# Patient Record
Sex: FEMALE | Race: ASIAN
[De-identification: names, ages, dates, MRNs, and addresses within clinical notes are randomized per-mention and may not be internally consistent; named-entity substitution may affect disease eponyms.]

---

## 2017-06-09 ENCOUNTER — HOSPITAL ENCOUNTER (OUTPATIENT)
Dept: HOSPITAL 62 - WI | Age: 57
End: 2017-06-09
Attending: FAMILY MEDICINE
Payer: OTHER GOVERNMENT

## 2017-06-09 DIAGNOSIS — Z12.31: Primary | ICD-10-CM

## 2017-06-09 DIAGNOSIS — N95.1: ICD-10-CM

## 2017-06-09 PROCEDURE — 77067 SCR MAMMO BI INCL CAD: CPT

## 2017-06-09 PROCEDURE — G0202 SCR MAMMO BI INCL CAD: HCPCS

## 2017-06-09 PROCEDURE — 77080 DXA BONE DENSITY AXIAL: CPT

## 2017-06-09 NOTE — WOMENS IMAGING REPORT
EXAM DESCRIPTION:  BONE DENSITY HIP/SPINE



COMPLETED DATE/TIME:  6/9/2017 2:02 pm



REASON FOR STUDY:  N95.1 Z12.31  ENCNTR SCREEN MAMMOGRAM FOR MALIGNANT NEOPLASM OF ZAID N95.1  MENOPAU
SAL AND FEMALE CLIMACTERIC STATES



COMPARISON:   None.



TECHNIQUE:  Dual-Energy X-ray Absorptiometry (DEXA) of the AP Spine and Hip.



LIMITATIONS:  None.



FINDINGS:  LUMBAR SPINE:

The bone mineral density (BMD) measured from L1-L4 in the AP projection correlates with a T-score of 
-2.5, which is osteopenia as defined by the World Health Organization.

HIP:

The bone mineral density (BMD) measured in the left hip correlates with a T-score of -2.1, which is o
steopenia as defined by the World Health Organization.



IMPRESSION:  1. LUMBAR SPINE: Osteopenia

2. HIP: Osteopenia



COMMENT:  The World Health Organization defines low BMD as follows:

T-score:

Normal:  Greater than -1.0

Osteopenia: Between -1.0 and -2.5

Osteoporosis:  Less than -2.5 without fractures

Established osteoporosis:  Less than -2.5 with fractures

In general, you may wish to consider:

Diagnosis          Treatment                     Follow-up DEXA

Normal BMD      Prevention                    2-3 years

Osteopenia       Prevention/Therapy        1-2 years

Osteoporosis     Therapy                        Yearly



TECHNICAL DOCUMENTATION:  JOB ID:  [id number]

 [employer]- All Rights Reserved

## 2017-06-09 NOTE — WOMENS IMAGING REPORT
EXAM DESCRIPTION:  BILAT SCREENING MAMMO W/CAD



COMPLETED DATE/TIME:  6/9/2017 2:03 pm



REASON FOR STUDY:  Z12.31, ROUTINE SCREENING MAMMO Z12.31  ENCNTR SCREEN MAMMOGRAM FOR MALIGNANT NEOP
LASM OF ZAID N95.1  MENOPAUSAL AND FEMALE CLIMACTERIC STATES



COMPARISON:  August 2015



TECHNIQUE:  Standard craniocaudal and mediolateral oblique views of each breast recorded using digita
l acquisition.



LIMITATIONS:  None.



FINDINGS:  No masses, calcifications or architectural distortion. No areas of suspicion.

Read with the assistance of CAD.

.[facility] - R2 Cenova Version 1.3

.[facility] - R2 Cenova Version 1.3

.[facility] - R2 Cenova Version 2.4

.[facility] - R2 Cenova Version 2.4

.[facility] - R2  Version 9.2



IMPRESSION:  NORMAL MAMMOGRAM.  BIRADS 1.



BREAST DENSITY:  c. The breasts are heterogeneously dense, which may obscure small masses.



BIRAD:  1 NEGATIVE



RECOMMENDATION:  ROUTINE SCREENING



COMMENT:  The patient has been notified of the results by letter per MQSA requirements. Additional no
tification policies are in place for contacting patient with suspicious or incomplete findings.

Quality ID #225: The American College of Radiology recommends an annual screening mammogram for women
 aged 40 years or over. This facility utilizes a reminder system to ensure that all patients receive 
reminder letters, and/or direct phone calls for appointments. This includes reminders for routine scr
eening mammograms, diagnostic mammograms, or other Breast Imaging Interventions when appropriate.  Th
is patient will be placed in the appropriate reminder system.

The American College of Radiology (ACR) has developed recommendations for screening MRI of the breast
s in certain patient populations, to be used in conjunction with mammography.  Breast MRI surveillanc
e may be appropriate for women with more than 20% lifetime risk of developing breast cancer  as deter
mined by genetic testing, significant family history of the disease, or history of mantle radiation f
or Hodgkins Disease.  ACR Practice Guidelines 2008.



TECHNICAL DOCUMENTATION:  FINDING NUMBER: (1)

ASSESSMENT: (1)

JOB ID:  [id number]

 2011 [employer]- All Rights Reserved

## 2020-11-11 ENCOUNTER — HOSPITAL ENCOUNTER (EMERGENCY)
Dept: HOSPITAL 62 - ER | Age: 60
Discharge: LEFT BEFORE DISCHARGE | End: 2020-11-11
Payer: COMMERCIAL

## 2020-11-11 VITALS — DIASTOLIC BLOOD PRESSURE: 90 MMHG | SYSTOLIC BLOOD PRESSURE: 159 MMHG

## 2020-11-11 DIAGNOSIS — Z53.20: ICD-10-CM

## 2020-11-11 DIAGNOSIS — R42: Primary | ICD-10-CM

## 2020-11-11 DIAGNOSIS — Z88.0: ICD-10-CM

## 2020-11-11 DIAGNOSIS — R11.0: ICD-10-CM

## 2020-11-11 DIAGNOSIS — Z79.899: ICD-10-CM

## 2020-11-11 DIAGNOSIS — H43.392: ICD-10-CM

## 2020-11-11 DIAGNOSIS — Z88.8: ICD-10-CM

## 2020-11-11 LAB
ADD MANUAL DIFF: NO
ALBUMIN SERPL-MCNC: 4.9 G/DL (ref 3.5–5)
ALP SERPL-CCNC: 100 U/L (ref 38–126)
ANION GAP SERPL CALC-SCNC: 10 MMOL/L (ref 5–19)
APPEARANCE UR: CLEAR
APTT PPP: COLORLESS S
AST SERPL-CCNC: 35 U/L (ref 14–36)
BASOPHILS # BLD AUTO: 0 10^3/UL (ref 0–0.2)
BASOPHILS NFR BLD AUTO: 0.7 % (ref 0–2)
BILIRUB DIRECT SERPL-MCNC: 0.1 MG/DL (ref 0–0.4)
BILIRUB SERPL-MCNC: 0.7 MG/DL (ref 0.2–1.3)
BILIRUB UR QL STRIP: NEGATIVE
BUN SERPL-MCNC: 13 MG/DL (ref 7–20)
CALCIUM: 9.6 MG/DL (ref 8.4–10.2)
CHLORIDE SERPL-SCNC: 106 MMOL/L (ref 98–107)
CO2 SERPL-SCNC: 24 MMOL/L (ref 22–30)
EOSINOPHIL # BLD AUTO: 0.1 10^3/UL (ref 0–0.6)
EOSINOPHIL NFR BLD AUTO: 1.2 % (ref 0–6)
ERYTHROCYTE [DISTWIDTH] IN BLOOD BY AUTOMATED COUNT: 13.6 % (ref 11.5–14)
GLUCOSE SERPL-MCNC: 126 MG/DL (ref 75–110)
GLUCOSE UR STRIP-MCNC: NEGATIVE MG/DL
HCT VFR BLD CALC: 41.8 % (ref 36–47)
HGB BLD-MCNC: 14.3 G/DL (ref 12–15.5)
KETONES UR STRIP-MCNC: NEGATIVE MG/DL
LYMPHOCYTES # BLD AUTO: 2 10^3/UL (ref 0.5–4.7)
LYMPHOCYTES NFR BLD AUTO: 30 % (ref 13–45)
MCH RBC QN AUTO: 31.1 PG (ref 27–33.4)
MCHC RBC AUTO-ENTMCNC: 34.4 G/DL (ref 32–36)
MCV RBC AUTO: 91 FL (ref 80–97)
MONOCYTES # BLD AUTO: 0.4 10^3/UL (ref 0.1–1.4)
MONOCYTES NFR BLD AUTO: 5.6 % (ref 3–13)
NEUTROPHILS # BLD AUTO: 4.1 10^3/UL (ref 1.7–8.2)
NEUTS SEG NFR BLD AUTO: 62.5 % (ref 42–78)
NITRITE UR QL STRIP: NEGATIVE
PH UR STRIP: 7 [PH] (ref 5–9)
PLATELET # BLD: 204 10^3/UL (ref 150–450)
POTASSIUM SERPL-SCNC: 4.2 MMOL/L (ref 3.6–5)
PROT SERPL-MCNC: 7.7 G/DL (ref 6.3–8.2)
PROT UR STRIP-MCNC: NEGATIVE MG/DL
RBC # BLD AUTO: 4.61 10^6/UL (ref 3.72–5.28)
SP GR UR STRIP: 1
TOTAL CELLS COUNTED % (AUTO): 100 %
UROBILINOGEN UR-MCNC: NEGATIVE MG/DL (ref ?–2)
WBC # BLD AUTO: 6.5 10^3/UL (ref 4–10.5)

## 2020-11-11 PROCEDURE — 81001 URINALYSIS AUTO W/SCOPE: CPT

## 2020-11-11 PROCEDURE — 99281 EMR DPT VST MAYX REQ PHY/QHP: CPT

## 2020-11-11 PROCEDURE — 85025 COMPLETE CBC W/AUTO DIFF WBC: CPT

## 2020-11-11 PROCEDURE — 84484 ASSAY OF TROPONIN QUANT: CPT

## 2020-11-11 PROCEDURE — 80053 COMPREHEN METABOLIC PANEL: CPT

## 2020-11-11 PROCEDURE — 36415 COLL VENOUS BLD VENIPUNCTURE: CPT

## 2020-11-11 NOTE — ER DOCUMENT REPORT
ED Medical Screen (RME)





- General


Chief Complaint: Dizziness


Stated Complaint: ANXIETY, BLOOD PRESSURE ISSUES


Time Seen by Provider: 11/11/20 17:41


Primary Care Provider: 


IFEANYI BERNARD MD [Primary Care Provider] - Follow up as needed


Mode of Arrival: Medic


Information source: Patient


Notes: 





60-year-old female presented to ED for complaint of dizziness and nausea.  She 

states she has had feeling like there was a foreign body in her left eye for 

about a month and then she developed floaters about a month ago and then she had

flashes started to cover the eye a month ago.  She states she went to Dr. Pittman 

yesterday he said he was going order referral to an ophthalmologist.  She states

she got all panicky today because she felt like she was going to pass out.  She 

states her blood pressure was very high she has some headache and nausea.  She 

states she told him that she had been having panic attacks and anxiety and she 

was already on first-line 0.25 mg as needed and he added Zoloft yesterday.  She 

states she took that and the alprazolam today and she got so anxious that she 

called 911 to bring her to the emergency room.  She states she would like some 

blood and urine completed to make sure she is okay before she goes home.














I have greeted and performed a rapid initial assessment of this patient.  A 

comprehensive ED assessment and evaluation of the patient, analysis of test 

results and completion of medical decision making process will be conducted by 

an additional ED providers.























TRAVEL OUTSIDE OF THE [place] IN LAST 30 DAYS: No





- Related Data


Allergies/Adverse Reactions: 


                                        





esomeprazole [From Nexium] Allergy (Verified 11/11/20 17:41)


   


Penicillins Allergy (Verified 11/11/20 17:41)


   








Home Medications: cholesterol.  anxiety.  zoloft.  xanax





Past Medical History





- Social History


Chew tobacco use (# tins/day): No


Frequency of alcohol use: None


Drug Abuse: None





- Past Medical History


Cardiac Medical History: Reports: Hx Hypercholesterolemia


Past Surgical History: Reports: Hx Gynecologic Surgery - right tube removal 

(ectopic pregnancy), Hx Thyroid Surgery





Physical Exam





- Vital signs


Vitals: 





                                        











Temp Pulse Resp BP Pulse Ox


 


 98.8 F   90   18   167/85 H  98 


 


 11/11/20 17:37  11/11/20 17:37  11/11/20 17:37  11/11/20 17:37  11/11/20 17:37














Course





- Vital Signs


Vital signs: 





                                        











Temp Pulse Resp BP Pulse Ox


 


 98.8 F   90   18   167/85 H  98 


 


 11/11/20 17:42  11/11/20 17:37  11/11/20 17:37  11/11/20 17:37  11/11/20 17:37














Doctor's Discharge





- Discharge


Referrals: 


IFEANYI BERNARD MD [Primary Care Provider] - Follow up as needed

## 2020-11-13 NOTE — XMS REPORT
Patient Summary Document

                          Created on:2020



Patient:FLORA NARANJO

Sex:Female

:1960

External Reference #:[id number]





Demographics







                          Address                   [address] [place] [address]

 

                          Home Phone                [phone];PREF

 

                          Work Phone                [phone]

 

                          Mobile Phone              [phone]

 

                          Preferred Language        English

 

                          Marital Status            Not  or 

 

                          Shinto Affiliation     Unknown

 

                          Race                      Unknown

 

                          Additional Race(s)        White

 

                          Ethnic Group              Not  or 









Author







                          Organization              [facility]

 

                          Address                   [facility] [address]



                                                    [place] [address]

 

                          Phone                     [phone]









Care Team Providers







                    Name                Role                Phone

 

                    Unavailable         Unavailable         Unavailable









Allergies, Adverse Reactions, Alerts







        Allergy Name Allergy Status  Severity Reaction(s) Onset   Inactive Treat

ing 

Comments



                Type                            Date    Date    Clinician 

 

        Nexium  Allergy to Active          Other                           



                substance                                                 

 

        Penicillins Allergy to Active          Rash                            



                substance                                                 

 

        Septra  Allergy to Active          Irregular                         



                substance                 heart rate                         







Medications







       Ordered Filled Start  Stop   Current Ordering Indication Dosage Frequency

 Signature

                          Comments                  Components



      Medication Medication Date  Date  Medication? Clinician                   

(SIG)       



      Name  Name                                                        

 

      alendronate                   No                            alendronat    

   



      70 mg                                                 e 70 mg       



      tablet TK                                                 tablet TK       



      ONE T PO Q                                                 ONE T PO Q     

  



      WEEK                                                  WEEK        

 

      alprazolam                   No                            alprazolam     

  



      0.5 mg                                                 0.5 mg       



      tablet One                                                 tablet One     

  



      tablet by                                                 tablet by       



      mouth twice                                                 mouth       



      daily as                                                 twice       



      needed for                                                 daily as       



      anxiety                                                 needed for       



                                                            anxiety       

 

      atorvastati                   No                1     Q1D   atorvastat    

   



      n 10 mg                                                 in 10 mg       



      tablet Take                                                 tablet       



      1 tablet                                                 Take 1       



      every day                                                 tablet       



      by oral                                                 every day       



      route in                                                 by oral       



      the evening                                                 route in      

 



      for 90                                                 the         



      days.                                                 evening       



                                                            for 90       



                                                            days.       

 

      cetirizine                   No                            cetirizine     

  



      10 mg                                                 10 mg       



      tablet Take                                                 tablet       



      1 tablet                                                 Take 1       



      every day                                                 tablet       



      by oral                                                 every day       



      route for                                                 by oral       



      90 days.                                                 route for       



                                                            90 days.       

 

      fluticasone                   No                            fluticason    

   



      propionate                                                 e           



      50                                                    propionate       



      mcg/actuati                                                 50          



      on nasal                                                 mcg/actuat       



      spray,suspe                                                 ion nasal     

  



      nsion 1-2                                                 spray,susp      

 



      sprays each                                                 ension 1-2    

   



      nostril                                                 sprays       



      qhs.                                                  each        



                                                            nostril       



                                                            qhs.        

 

      ipratropium                   No                            ipratropiu    

   



      bromide 42                                                 m bromide      

 



      mcg (0.06                                                 42 mcg       



      %) nasal                                                 (0.06 %)       



      spray Spray                                                 nasal       



      2 sprays                                                 spray       



      twice a day                                                 Spray 2       



      by                                                    sprays       



      intranasal                                                 twice a       



      route for 7                                                 day by       



      days.                                                 intranasal       



                                                            route for       



                                                            7 days.       

 

      levocetiriz                   No                            levocetiri    

   



      ine 5 mg                                                 zine 5 mg       



      tablet Take                                                 tablet       



      1 tablet                                                 Take 1       



      every day                                                 tablet       



      by oral                                                 every day       



      route for                                                 by oral       



      90 days.                                                 route for       



                                                            90 days.       

 

      loratadine                   No                            loratadine     

  



      10 mg                                                 10 mg       



      tablet                                                 tablet       

 

      meloxicam                   No                            meloxicam       



      15 mg                                                 15 mg       



      tablet Take                                                 tablet       



      1 tablet                                                 Take 1       



      every day                                                 tablet       



      by oral                                                 every day       



      route with                                                 by oral       



      meals for                                                 route with      

 



      30 days.                                                 meals for       



                                                            30 days.       

 

      montelukast                   No                            montelukas    

   



      10 mg                                                 t 10 mg       



      tablet Take                                                 tablet       



      1 tablet(s)                                                 Take 1       



      every day                                                 tablet(s)       



      by oral                                                 every day       



      route for                                                 by oral       



      90 days.                                                 route for       



                                                            90 days.       

 

      Zoloft 50                   No                1     Q1D   Zoloft 50       



      mg tablet                                                 mg tablet       



      Take 1                                                 Take 1       



      tablet                                                 tablet       



      every day                                                 every day       



      by oral                                                 by oral       



      route at                                                 route at       



      bedtime for                                                 bedtime       



      30 days.                                                 for 30       



                                                            days.       







Problems







        Condition Condition Condition Status  Onset   Resolution Last    Treatin

g Comments



        Name    Details Category         Date    Date    Treatment Clinician 



                                                        Date            

 

        Microalbumi Microalbumi Problem Active  2018                          



        yoel   yoel                   2-14                            



                                        00:00:                          



                                        00                              

 

        Carpal  Carpal  Problem Active                            



        tunnel  Tunnel                  3-17                            



        syndrome of Syndrome of                 00:00:                          



        left wrist Left Wrist                 00                              

 

        Osteopenia Osteopenia Problem Active                            



                                        6-14                            



                                        00:00:                          



                                        00                              

 

        Elevated Elevated Problem Active                            



        blood-press Blood-press                 5-24                            



        ure reading ure Reading                 00:00:                          



        without without                 00                              



        diagnosis Diagnosis                                                 



        of      of                                                      



        hypertensio Hypertensio                                                 



        n       n                                                       

 

        Generalized Generalized Problem Active                            



        anxiety Anxiety                 3-21                            



        disorder Disorder                 00:00:                          



                                        00                              

 

        Gastroesoph Gastroesoph Problem Active                                  



        ageal   ageal                                                   



        reflux  Reflux                                                  



        disease Disease                                                 

 

        Chronic Chronic Problem Active                                  



        kidney  Kidney                                                  



        disease Disease                                                 



        stage 2 Stage 2                                                 

 

        Menopausal Menopausal Problem Active                                  



        flushing Flushing                                                 

 

        Vitamin D Vitamin D Problem Active                                  



        deficiency Deficiency                                                 

 

        Hyperlipide Hyperlipide Problem Active                                  



        kelsey     kelsey                                                     







Procedures







                Procedure       Date / Time Performed Performing Clinician Devic e

 

                pulse oximetry (PROC) 2020-11-10 00:00:00                 

 

                Thyroid Surgery                                 

 

                Tubal Ligation                                  







Results

This patient has no known results.



Assessments







                Condition Name  Status          Diagnosis Date  Treating Clinici

an

 

                Generalized anxiety disorder Active          2020-11-10 06:50:43

 

 

                Long-term drug therapy Active          2020-11-10 06:50:52 

 

                Medication monitoring Active          2020-11-10 06:51:04 

 

                Pain of left eye Active          2020-11-10 13:43:09 

 

                Hyperlipidemia  Active          2020-11-10 13:41:34 

 

                Administration of influenza vaccine Active          2020-11-10 1

3:41:26 







Encounters







        Start   End     Encounter Admission Attending Care    Care    Encounter



        Date/Time Date/Time Type    Type    Clinicians Facility Department ID

 

        2020-11-10 2020-11-10 Edward                  [id number]

20



        00:00:00 00:00:00 MD Babak:                 [facility]



                        [address] [facility],                                   



                        [place], [place]                                   



                        [address],                                 



                        Ph. [phone]                                 







Immunizations







           Ordered Immunization Filled Immunization Date       Status     Commen

ts   Refusal Reason



           Name       Name                                        

 

           influenza,            2020-11-10 Completed             



           injectable,            13:50:55                         



           quadrivalent                                             

 

           Tdap                  2020 Completed             



                                 09:37:00                         

 

           influenza, seasonal,            2014-10-16 Completed             



           injectable            16:03:22                         







Plan of Treatment







                Planned Activity Planned Date    Details         Comments

 

                Future Appointment 2021 08:30:00 Alfred Hilliard, [address] [place] [address]; [place] [address] 

 

                Future Appointment 2020 08:15:00 Nurse, [address] [place] [address]

; , 



                                                [place] [address] 

 

                Future Appointment 2020 13:30:00 Alfred Hilliard, [address] [place] [address]; , 



                                                [place] [address] 







Social History







                    Smoking Status      Start Date          Stop Date

 

                    Never Smoker                            







Vital Signs







                Vital Name      Observation Time Observation Value Comments

 

                BP Diastolic    2020-11-10 00:00:00 73 mm[Hg]       

 

                Height          2020-11-10 00:00:00 61 [in_i]       

 

                BMI (Body Mass Index) 2020-11-10 00:00:00 22.5 kg/m2      

 

                BP Systolic     2020-11-10 00:00:00 138 mm[Hg]      

 

                Body Weight     2020-11-10 00:00:00 119.2 [lb_av]   







Hospital Discharge Instructions

1. Generalized anxiety disorder  Zoloft 50 mg tablet 2. Long-term drug 
therapy  pulse oximetry(PROC) 3. Medication monitoring 4. Pain of left eye 
 ophthalmology referral - PLEASE EVAL AND MAKE RECOMMENDATIONS 5. 
Hyperlipidemia  atorvastatin 10 mg tablet 6. Administration of influenza 
vaccine  Afluria Quad [id number] 60 mcg (15 mcg x 4)/0.5 mL intramuscular 
susp. Discussion Note All pt.questions and concerns were addressed and answered.
 Pt. verbalized understanding and agreement of treatment plan. I spent up to 15 
minutes of face to face time with patient and &gt; 50% was spent in counseling 
and/or coordination of care. Quality measures/ USPSTF/AAFP screening guidelines 
and recommendations were reviewed and discussed and appropriate orders initiated
 if applicable. Patient educational handouts: No information available.